# Patient Record
Sex: FEMALE | Race: OTHER | URBAN - METROPOLITAN AREA
[De-identification: names, ages, dates, MRNs, and addresses within clinical notes are randomized per-mention and may not be internally consistent; named-entity substitution may affect disease eponyms.]

---

## 2020-02-03 ENCOUNTER — OFFICE VISIT (OUTPATIENT)
Dept: PRIMARY CARE CLINIC | Age: 30
End: 2020-02-03

## 2020-02-03 VITALS
HEART RATE: 77 BPM | WEIGHT: 139.2 LBS | DIASTOLIC BLOOD PRESSURE: 80 MMHG | SYSTOLIC BLOOD PRESSURE: 130 MMHG | OXYGEN SATURATION: 99 % | TEMPERATURE: 97.8 F

## 2020-02-03 LAB
A/G RATIO: 1.6 (ref 1.1–2.2)
ALBUMIN SERPL-MCNC: 4.9 G/DL (ref 3.4–5)
ALP BLD-CCNC: 65 U/L (ref 40–129)
ALT SERPL-CCNC: 17 U/L (ref 10–40)
ANION GAP SERPL CALCULATED.3IONS-SCNC: 13 MMOL/L (ref 3–16)
AST SERPL-CCNC: 19 U/L (ref 15–37)
BASOPHILS ABSOLUTE: 0.1 K/UL (ref 0–0.2)
BASOPHILS RELATIVE PERCENT: 1 %
BILIRUB SERPL-MCNC: 0.4 MG/DL (ref 0–1)
BUN BLDV-MCNC: 13 MG/DL (ref 7–20)
CALCIUM SERPL-MCNC: 9.5 MG/DL (ref 8.3–10.6)
CHLORIDE BLD-SCNC: 104 MMOL/L (ref 99–110)
CHOLESTEROL, FASTING: 187 MG/DL (ref 0–199)
CO2: 25 MMOL/L (ref 21–32)
CREAT SERPL-MCNC: <0.5 MG/DL (ref 0.6–1.1)
EOSINOPHILS ABSOLUTE: 0.1 K/UL (ref 0–0.6)
EOSINOPHILS RELATIVE PERCENT: 1.6 %
GFR AFRICAN AMERICAN: >60
GFR NON-AFRICAN AMERICAN: >60
GLOBULIN: 3 G/DL
GLUCOSE FASTING: 90 MG/DL (ref 70–99)
HCT VFR BLD CALC: 39.7 % (ref 36–48)
HDLC SERPL-MCNC: 45 MG/DL (ref 40–60)
HEMOGLOBIN: 13.2 G/DL (ref 12–16)
LDL CHOLESTEROL CALCULATED: 117 MG/DL
LYMPHOCYTES ABSOLUTE: 2.2 K/UL (ref 1–5.1)
LYMPHOCYTES RELATIVE PERCENT: 34 %
MCH RBC QN AUTO: 30.7 PG (ref 26–34)
MCHC RBC AUTO-ENTMCNC: 33.4 G/DL (ref 31–36)
MCV RBC AUTO: 92.2 FL (ref 80–100)
MONOCYTES ABSOLUTE: 0.4 K/UL (ref 0–1.3)
MONOCYTES RELATIVE PERCENT: 6.6 %
NEUTROPHILS ABSOLUTE: 3.7 K/UL (ref 1.7–7.7)
NEUTROPHILS RELATIVE PERCENT: 56.8 %
PDW BLD-RTO: 13 % (ref 12.4–15.4)
PLATELET # BLD: 217 K/UL (ref 135–450)
PMV BLD AUTO: 10.4 FL (ref 5–10.5)
POTASSIUM SERPL-SCNC: 4.2 MMOL/L (ref 3.5–5.1)
RBC # BLD: 4.3 M/UL (ref 4–5.2)
SODIUM BLD-SCNC: 142 MMOL/L (ref 136–145)
TOTAL PROTEIN: 7.9 G/DL (ref 6.4–8.2)
TRIGLYCERIDE, FASTING: 126 MG/DL (ref 0–150)
VLDLC SERPL CALC-MCNC: 25 MG/DL
WBC # BLD: 6.5 K/UL (ref 4–11)

## 2020-02-03 PROCEDURE — 99203 OFFICE O/P NEW LOW 30 MIN: CPT | Performed by: INTERNAL MEDICINE

## 2020-02-03 ASSESSMENT — PATIENT HEALTH QUESTIONNAIRE - PHQ9
2. FEELING DOWN, DEPRESSED OR HOPELESS: 0
SUM OF ALL RESPONSES TO PHQ9 QUESTIONS 1 & 2: 0
1. LITTLE INTEREST OR PLEASURE IN DOING THINGS: 0
SUM OF ALL RESPONSES TO PHQ QUESTIONS 1-9: 0
SUM OF ALL RESPONSES TO PHQ QUESTIONS 1-9: 0

## 2020-02-03 ASSESSMENT — ENCOUNTER SYMPTOMS
COUGH: 0
ABDOMINAL DISTENTION: 0
BACK PAIN: 0
CHEST TIGHTNESS: 0
ABDOMINAL PAIN: 0
DIARRHEA: 0
NAUSEA: 0
SHORTNESS OF BREATH: 0

## 2020-02-03 NOTE — PROGRESS NOTES
SUBJECTIVE:  2/3/2020   Eleni Otero  1990    No past medical history on file. No past surgical history on file. No family history on file. Social History     Socioeconomic History    Marital status: Not on file     Spouse name: Not on file    Number of children: Not on file    Years of education: Not on file    Highest education level: Not on file   Occupational History    Not on file   Social Needs    Financial resource strain: Not on file    Food insecurity:     Worry: Not on file     Inability: Not on file    Transportation needs:     Medical: Not on file     Non-medical: Not on file   Tobacco Use    Smoking status: Never Smoker    Smokeless tobacco: Never Used   Substance and Sexual Activity    Alcohol use: Not on file    Drug use: Not on file    Sexual activity: Not on file   Lifestyle    Physical activity:     Days per week: Not on file     Minutes per session: Not on file    Stress: Not on file   Relationships    Social connections:     Talks on phone: Not on file     Gets together: Not on file     Attends [employer] service: Not on file     Active member of club or organization: Not on file     Attends meetings of clubs or organizations: Not on file     Relationship status: Not on file    Intimate partner violence:     Fear of current or ex partner: Not on file     Emotionally abused: Not on file     Physically abused: Not on file     Forced sexual activity: Not on file   Other Topics Concern    Not on file   Social History Narrative    Not on file      No Known Allergies   No current outpatient medications on file. No current facility-administered medications for this visit. Chief Complaint   Patient presents with   Alexis Peralta New Doctor     Speaks Citizen of [place] but understands a lot of English    HPI: New patient visit, she states she is here to establish with a new PCP. She takes no chronic medications, has no ongoing complaints.   She is never had any fasting blood

## 2020-07-14 LAB — PAP SMEAR, EXTERNAL: NEGATIVE

## 2022-01-04 ENCOUNTER — OFFICE VISIT (OUTPATIENT)
Dept: PRIMARY CARE CLINIC | Age: 32
End: 2022-01-04

## 2022-01-04 VITALS
HEART RATE: 83 BPM | DIASTOLIC BLOOD PRESSURE: 76 MMHG | WEIGHT: 127.4 LBS | HEIGHT: 61 IN | BODY MASS INDEX: 24.05 KG/M2 | SYSTOLIC BLOOD PRESSURE: 106 MMHG

## 2022-01-04 DIAGNOSIS — F41.8 DEPRESSION WITH ANXIETY: ICD-10-CM

## 2022-01-04 DIAGNOSIS — G47.00 INSOMNIA, UNSPECIFIED TYPE: ICD-10-CM

## 2022-01-04 DIAGNOSIS — Z13.29 THYROID DISORDER SCREEN: ICD-10-CM

## 2022-01-04 DIAGNOSIS — Z12.4 PAP SMEAR FOR CERVICAL CANCER SCREENING: ICD-10-CM

## 2022-01-04 DIAGNOSIS — Z23 NEEDS FLU SHOT: ICD-10-CM

## 2022-01-04 DIAGNOSIS — F41.8 DEPRESSION WITH ANXIETY: Primary | ICD-10-CM

## 2022-01-04 LAB
A/G RATIO: 1.3 (ref 1.1–2.2)
ALBUMIN SERPL-MCNC: 4.6 G/DL (ref 3.4–5)
ALP BLD-CCNC: 64 U/L (ref 40–129)
ALT SERPL-CCNC: 6 U/L (ref 10–40)
ANION GAP SERPL CALCULATED.3IONS-SCNC: 12 MMOL/L (ref 3–16)
AST SERPL-CCNC: 21 U/L (ref 15–37)
BASOPHILS ABSOLUTE: 0 K/UL (ref 0–0.2)
BASOPHILS RELATIVE PERCENT: 0.3 %
BILIRUB SERPL-MCNC: 0.6 MG/DL (ref 0–1)
BUN BLDV-MCNC: 11 MG/DL (ref 7–20)
CALCIUM SERPL-MCNC: 9.5 MG/DL (ref 8.3–10.6)
CHLORIDE BLD-SCNC: 99 MMOL/L (ref 99–110)
CO2: 25 MMOL/L (ref 21–32)
CREAT SERPL-MCNC: <0.5 MG/DL (ref 0.6–1.1)
EOSINOPHILS ABSOLUTE: 0.1 K/UL (ref 0–0.6)
EOSINOPHILS RELATIVE PERCENT: 1.3 %
GFR AFRICAN AMERICAN: >60
GFR NON-AFRICAN AMERICAN: >60
GLUCOSE BLD-MCNC: 93 MG/DL (ref 70–99)
HCT VFR BLD CALC: 39.3 % (ref 36–48)
HEMOGLOBIN: 13.7 G/DL (ref 12–16)
LYMPHOCYTES ABSOLUTE: 2.4 K/UL (ref 1–5.1)
LYMPHOCYTES RELATIVE PERCENT: 29.9 %
MCH RBC QN AUTO: 30.6 PG (ref 26–34)
MCHC RBC AUTO-ENTMCNC: 34.8 G/DL (ref 31–36)
MCV RBC AUTO: 87.9 FL (ref 80–100)
MONOCYTES ABSOLUTE: 0.6 K/UL (ref 0–1.3)
MONOCYTES RELATIVE PERCENT: 7.4 %
NEUTROPHILS ABSOLUTE: 4.9 K/UL (ref 1.7–7.7)
NEUTROPHILS RELATIVE PERCENT: 61.1 %
PDW BLD-RTO: 12.8 % (ref 12.4–15.4)
PLATELET # BLD: 258 K/UL (ref 135–450)
PMV BLD AUTO: 9.1 FL (ref 5–10.5)
POTASSIUM SERPL-SCNC: 4.2 MMOL/L (ref 3.5–5.1)
RBC # BLD: 4.47 M/UL (ref 4–5.2)
SODIUM BLD-SCNC: 136 MMOL/L (ref 136–145)
TOTAL PROTEIN: 8.2 G/DL (ref 6.4–8.2)
TSH SERPL DL<=0.05 MIU/L-ACNC: 0.96 UIU/ML (ref 0.27–4.2)
WBC # BLD: 8 K/UL (ref 4–11)

## 2022-01-04 PROCEDURE — 90471 IMMUNIZATION ADMIN: CPT | Performed by: INTERNAL MEDICINE

## 2022-01-04 PROCEDURE — 90674 CCIIV4 VAC NO PRSV 0.5 ML IM: CPT | Performed by: INTERNAL MEDICINE

## 2022-01-04 PROCEDURE — 99214 OFFICE O/P EST MOD 30 MIN: CPT | Performed by: INTERNAL MEDICINE

## 2022-01-04 RX ORDER — LANOLIN ALCOHOL/MO/W.PET/CERES
3 CREAM (GRAM) TOPICAL NIGHTLY
COMMUNITY
End: 2022-04-04

## 2022-01-04 RX ORDER — HYDROXYZINE PAMOATE 25 MG/1
CAPSULE ORAL
COMMUNITY
Start: 2021-12-28 | End: 2022-04-04

## 2022-01-04 RX ORDER — HYDROXYZINE PAMOATE 25 MG/1
25 CAPSULE ORAL 3 TIMES DAILY PRN
COMMUNITY
Start: 2021-12-28 | End: 2022-04-04

## 2022-01-04 RX ORDER — DOXEPIN HYDROCHLORIDE 25 MG/1
25 CAPSULE ORAL NIGHTLY
Qty: 30 CAPSULE | Refills: 5 | Status: SHIPPED | OUTPATIENT
Start: 2022-01-09 | End: 2022-04-04

## 2022-01-04 RX ORDER — DOXEPIN HYDROCHLORIDE 10 MG/1
10 CAPSULE ORAL NIGHTLY
Qty: 5 CAPSULE | Refills: 0 | Status: SHIPPED | OUTPATIENT
Start: 2022-01-04 | End: 2022-04-04

## 2022-01-04 RX ORDER — TRAZODONE HYDROCHLORIDE 50 MG/1
TABLET ORAL
COMMUNITY
Start: 2021-12-23 | End: 2022-01-04

## 2022-01-04 ASSESSMENT — ENCOUNTER SYMPTOMS
NAUSEA: 0
COUGH: 0
BLOOD IN STOOL: 0
CONSTIPATION: 0
WHEEZING: 0
ABDOMINAL PAIN: 0
DIARRHEA: 0
SHORTNESS OF BREATH: 0
EYE REDNESS: 0
COLOR CHANGE: 0
ABDOMINAL DISTENTION: 0
SINUS PRESSURE: 0
TROUBLE SWALLOWING: 0
SORE THROAT: 0
EYE PAIN: 0
CHEST TIGHTNESS: 0
VOMITING: 0
BACK PAIN: 0

## 2022-01-04 ASSESSMENT — PATIENT HEALTH QUESTIONNAIRE - PHQ9
SUM OF ALL RESPONSES TO PHQ QUESTIONS 1-9: 7
6. FEELING BAD ABOUT YOURSELF - OR THAT YOU ARE A FAILURE OR HAVE LET YOURSELF OR YOUR FAMILY DOWN: 1
7. TROUBLE CONCENTRATING ON THINGS, SUCH AS READING THE NEWSPAPER OR WATCHING TELEVISION: 1
SUM OF ALL RESPONSES TO PHQ QUESTIONS 1-9: 7
3. TROUBLE FALLING OR STAYING ASLEEP: 2
1. LITTLE INTEREST OR PLEASURE IN DOING THINGS: 1
SUM OF ALL RESPONSES TO PHQ9 QUESTIONS 1 & 2: 2
SUM OF ALL RESPONSES TO PHQ QUESTIONS 1-9: 2
SUM OF ALL RESPONSES TO PHQ QUESTIONS 1-9: 7
SUM OF ALL RESPONSES TO PHQ QUESTIONS 1-9: 2
2. FEELING DOWN, DEPRESSED OR HOPELESS: 1
5. POOR APPETITE OR OVEREATING: 0
SUM OF ALL RESPONSES TO PHQ QUESTIONS 1-9: 2
4. FEELING TIRED OR HAVING LITTLE ENERGY: 2
8. MOVING OR SPEAKING SO SLOWLY THAT OTHER PEOPLE COULD HAVE NOTICED. OR THE OPPOSITE, BEING SO FIGETY OR RESTLESS THAT YOU HAVE BEEN MOVING AROUND A LOT MORE THAN USUAL: 1
10. IF YOU CHECKED OFF ANY PROBLEMS, HOW DIFFICULT HAVE THESE PROBLEMS MADE IT FOR YOU TO DO YOUR WORK, TAKE CARE OF THINGS AT HOME, OR GET ALONG WITH OTHER PEOPLE: 1
SUM OF ALL RESPONSES TO PHQ QUESTIONS 1-9: 7
SUM OF ALL RESPONSES TO PHQ QUESTIONS 1-9: 2
9. THOUGHTS THAT YOU WOULD BE BETTER OFF DEAD, OR OF HURTING YOURSELF: 0

## 2022-01-04 NOTE — ASSESSMENT & PLAN NOTE
Patient  with depression anxiety and insomnia. Trazodone did not help    Patient has tried melatonin. We discussed starting doxepin which  Has indications for depression, anxiety, and insomnia. We will try to titrate dose granted patient is able to tolerate medicine.   Patient informed she is to call the office if any side effects from the medicine

## 2022-01-04 NOTE — ASSESSMENT & PLAN NOTE
Patient patient with depression anxiety and insomnia. Trazodone did not help   We discussed starting doxepin which  Has indications for depression, anxiety, and insomnia. We will try to titrate dose granted patient is able to tolerate medicine.   Patient informed she is to call the office if any side effects from the medicine  Patient given paperwork for clinic with access to a counsellor

## 2022-01-04 NOTE — PATIENT INSTRUCTIONS
Patient Education        Trastorno de ansiedad: Instrucciones de cuidado  Anxiety Disorder: Care Instructions  Instrucciones de cuidado    La ansiedad es clyde reacción normal contra el estrés. Las situaciones difíciles le pueden causar síntomas sj sudoración en las wendy de las don y clyde sensación de nerviosismo. En el trastorno de [place], los síntomas son W prosper graves. La preocupación cam, la tensión muscular, la dificultad para dormir, las náuseas y la diarrea, y otros síntomas pueden hacer que las actividades diarias se sravani difíciles o imposibles. Estos síntomas pueden ocurrir sin ninguna causa y pueden afectar miramontes Richard Rogue y miramontes emmanuel social. Los medicamentos, la asesoría psicológica y el cuidado personal pueden ayudar. La atención de seguimiento es clyde parte clave de miramontes tratamiento y seguridad. Asegúrese de hacer y acudir a todas las citas, y llame a miramontes médico si está teniendo problemas. También es clyde buena idea saber los resultados de david exámenes y mantener clyde lista de los medicamentos que antonia. ¿Cómo puede cuidarse en el hogar? · [place] david medicamentos exactamente [employer]	[place] indicaron. Llame a miramontes médico si renee estar teniendo problemas con miramontes medicamento. · Asista a todas las citas de asesoría psicológica y [id number]. · Reconozca y acepte miramontes ansiedad. Entonces, cuando esté en clyde situación que le katharina sentir ansioso, dígase a usted mismo, \"[place] no es clyde emergencia. Me siento incómodo, ry no estoy en peligro. Puedo continuar aunque estoy ansioso\". · Cuide miramontes cuerpo:  ? Alivie la tensión con ejercicios o masajes. ? Descanse lo suficiente. ? Evite el alcohol, la cafeína, la nicotina y las drogas [address] Estas sustancias pueden aumentar los niveles de ansiedad y ocasionarle problemas para dormir. ? Soledad Willow y practique técnicas de relajación. A continuación encontrará más datos sobre estas técnicas. · Mantenga ocupada miramontes mente.  Kelsey y katharina [employer] gustan. Vaya a lucero clyde película cómica, o salga de paseo o a caminar. Planifique miramontes día. Tener demasiadas actividades o muy pocas le puede crear ansiedad. · Lleve un registro de david síntomas. Hable sobre david temores con un buen amigo o un miembro de la [place], o únase a un hosea de apoyo para personas con problemas similares. Hablar con otras personas a veces ariel el estrés. · Participe en grupos sociales u ofrézcase sj voluntario para ayudar a otras personas. Estar solo a veces hace que las cosas parezcan peores de lo que son. · Fabien ejercicio por lo menos 30 minutos la [employer] días de la semana para aliviar el estrés. Caminar es clyde buena opción. Es posible que también quiera hacer otras actividades, sj correr, nadar, [employer], o jugar al tenis u otros deportes de equipo. Técnicas de relajación  Fabien ejercicios de relajación ricky 10 a 20 minutos al día. Si lo desea, mientras los hace puede escuchar música tranquila y relajante. · Dígales a las demás personas de miramontes hogar que va a hacer ejercicios de relajación. Pídales que no lo interrumpan. · Encuentre un lugar cómodo, lejos de toda distracción y ruido. · Acuéstese boca arriba o siéntese con la espalda derecha. · Concéntrese en miramontes respiración. Respire de [place] lenta y cam. · Inhale por la [place]. Exhale por la nariz o la boca. · Respire profundamente, llenando la kavin entre el ombligo y la caja torácica. Respire de forma iam que el estómago se mueva hacia arriba y [place]. · No contenga la respiración. · Respire de esta manera ricky 5 a 10 minutos. [place] la sensación de serenidad en todo el cuerpo. Mientras continúa con la respiración lenta y profunda, relájese con los siguientes ejercicios ricky otros 5 a 10 minutos:  · Tensione y relaje cada hosea de músculos de miramontes cuerpo. Puede comenzar con la punta de los pies y continuar hacia arriba hasta llegar a la philip.   · Imagine a los grupos de [employer] bajo licencia por [facility] Si usted tiene [place] afección médica o sobre estas instrucciones, siempre pregunte a miramontes profesional de bonnie. [facility] niega toda garantía o responsabilidad por miramontes uso de esta información. Patient Education        Insomnio: Instrucciones de cuidado  Insomnia: Care Instructions  Generalidades     Insomnio es la incapacidad de dormir ascencion. El insomnio puede dificultar que usted concilie el sueño, permanezca dormido o duerma tanto sj necesita. [place] puede hacer que esté cansado y malhumorado ricky el día. También puede hacer que sea [place], [facility] y [id number] en [place]. El insomnio puede estar relacionado con muchas cosas. Estas incluyen problemas de bonnie, medicamentos y acontecimientos estresantes. El tratamiento puede incluir tratar los problemas que podrían estar relacionados con el insomnio. [address] incluye cambios conductuales y de   [place]. [place] puede incluir terapia cognitivo-conductual para el insomnio (CBT-I, por david siglas en inglés). La CBT-I [employer] de ayudarle a cambiar los pensamientos y comportamientos que pueden interferir en el sueño. El médico puede recomendarle cosas específicas que puede probar. Los ejemplos incluyen hacer ejercicios de relajación, mantener horarios regulares para acostarse y despertarse, limitar el consumo de alcohol y adoptar hábitos de sueño saludables. Algunas personas deciden ru medicamentos ricky un tiempo sj ayuda para dormir. La atención de seguimiento es clyde parte clave de miramontes tratamiento y seguridad. Asegúrese de hacer y acudir a todas las citas, y llame a miramontes médico si está teniendo problemas. También es clyde buena idea saber los resultados de david exámenes y mantener clyde lista de los medicamentos que antonia. ¿Cómo puede cuidarse en el hogar?   Terapia cognitivo-conductual para el insomnio (CBT-I, por david siglas en inglés)  · Si miramontes médico recomienda la CBT-I, siga miramontes plan de tratamiento. El médico le dará instrucciones únicas para usted. · Es probable que miramontes plan incluya algunas cosas que puede probar en casa. Por ejemplo:  ? Pruebe la meditación u otras técnicas de relajación antes de [place]. ? Acuéstese a la misma hora todas las noches y despiértese a la misma hora todas las [place]. No duerma siestas ricky el día. ? No permanezca despierto en la cama ricky mucho tiempo. Si no puede conciliar el sueño o si se despierta en mitad de la noche y no puede volver a dormirse al cabo de unos 15 a 20 minutos, levántese de la cama y Kirsten Mansi a otra habitación hasta que sienta sueño. ? Si mirar el reloj le causa ansiedad, gírelo para que no pueda lucero la hora. ? Si se preocupa cuando se acuesta, empiece un \"libro de preocupaciones\". Bastante antes de que llegue la hora de [place], anote david preocupaciones y luego deje el libro y david inquietudes a un lado. Hábitos de sueño saludables  · Si miramontes médico lo recomienda, trate de establecer hábitos de sueño saludables. Por ejemplo:  ? Mantenga el dormitorio tranquilo, oscuro y fresco.  ? No ingiera bebidas con cafeína, sj café o té alyssa, ricky 8 horas antes de acostarse.  ? No fume ni use otros tipos de tabaco cerca de la hora de acostarse. La nicotina es un estimulante y puede mantenerlo despierto. ? Evite beber alcohol a última hora de la tarde, ya que puede hacer que se despierte en mitad de la noche.  ? No ingiera clyde comida abundante cerca de la hora de [place]. Si tiene hambre, coma un refrigerio ligero. ? No terrell mucha agua cerca de la hora de acostarse, porque la necesidad de orinar puede despertarlo por la noche.  ? No mirian, bernardo televisión ni use miramontes teléfono en la cama. Use la cama solo para dormir y [place]. Medicamentos  · Sea mateo con los medicamentos. Mier david medicamentos exactamente sj se los recetaron.  Llame a miramontes médico si renee que está teniendo un problema con miramontes medicamento. · Hable con miramontes médico antes de probar un medicamento de venta madelin, producto herbario o suplemento para tratar de dormir mejor. Miramontes médico puede recomendarle la cantidad que debe ru y cuándo tomarlo. Asegúrese de que miramontes médico sepa acerca de todos los medicamentos, vitaminas, productos herbarios y suplementos que antonia. · Recibirá [employer] medicamentos específicos recetados por el médico.  ¿Cuándo debe pedir ayuda? Preste especial atención a los cambios en miramontes bonnie y asegúrese de comunicarse con miramontes médico si:    · Mercedes esfuerzos por mejorar el sueño no eugene resultado.     · Miramontes insomnio empeora.     · Se ha estado sintiendo decaído, deprimido o desesperanzado, o ha perdido el interés por cosas que disfrutaba hacer. ¿Dónde puede encontrar más información en inglés? Amadeo Monge a [email or web address] e ingrese a miramontes cuenta de MyChart. [place] P513 en el [place]ola Juan \"Search Health Information\" para más información (en inglés) sobre \"Insomnio: Instrucciones de cuidado. \"     Si no tiene clyde cuenta, katharina lino en el enlace \"Sign Up Now\". Revisado: 16 junio, 2021               Versión del contenido: 13.1  © 6913-3190 Healthwise, Incorporated. Las instrucciones de cuidado fueron adaptadas bajo licencia por [facility] ([facility]). Si usted tiene [place] afección médica o sobre estas instrucciones, siempre pregunte a miramontes profesional de bonnie. Healthwise, Incorporated niega toda garantía o responsabilidad por miramontes uso de esta información. Patient Education        Recuperación de la depresión: Instrucciones de cuidado  Recovering From Depression: Care Instructions  Instrucciones de cuidado     Tener un buen cuidado de usted mismo es importante a medida que se recupera de la depresión. Con el tiempo, a medida que el tratamiento funcione mercedes síntomas desaparecerán. No lo abandone. Al contrario, concentre miramontes energía en recuperarse. Miramontes estado de ánimo mejorará.  Solo tomará un tiempo. Concéntrese en cosas que le pueden ayudar a sentirse mejor, sj estar con amigos o familiares, comer ascencion y descansar lo suficiente. [place]bel tómese las cosas con tranquilidad. No katharina muchas actividades demasiado pronto. Abeba Bodo a sentirse mejor poco a [place] de seguimiento es clyde parte clave de miramontes tratamiento y seguridad. Asegúrese de hacer y acudir a todas las citas, y llame a miramontes médico si está teniendo problemas. También es clyde buena idea saber los resultados de david exámenes y mantener clyde lista de los medicamentos que antonia. ¿Cómo puede cuidarse en el hogar? Sea realista  · Si debe hacer clyde tarea que le llevará [place] en varias etapas pequeñas que usted pueda manejar y katharina solo lo que pueda. · Es posible que [place] posponer las decisiones importantes hasta que se haya recuperado de la depresión. Si tiene planes que tendrán un gran impacto en miramontes emmanuel, sj casarse, divorciarse o cambiar de [place], intente esperar un poco. Háblelo con david amigos y seres queridos, quienes pueden ayudarle a analizar el panorama completo. · Es importante acercarse a las personas para pedirles ayuda. No se aísle. Deje que miramontes nina y [employer]. Encuentre a alguien en quien pueda confiar y hable con lore persona. · Sea paciente y bondadoso consigo mismo. Recuerde que la depresión no es miramontes culpa y no es algo que usted pueda superar solo con fuerza de voluntad. El tratamiento es importante para la depresión, al igual que para cualquier otra enfermedad. Sentirse mejor lleva tiempo, y miramontes estado de ánimo mejorará poco a poco.  Manténgase activo  · Manténgase ocupado y [address]. Salga a caminar o intente con algún otro ejercicio suave. · Consulte a miramontes médico acerca de algún programa de ejercicios. El ejercicio le puede ayudar a aliviar la depresión leve. · Vaya al cine o a un concierto.  Participe en alguna actividad de la joseline o [place] otra reunión social. Kendrick Don a lucero a un partido de fútbol. · Pídale a un amigo que cene con usted. Cuídese  · Siga clyde dieta equilibrada con muchas frutas y verduras frescas, granos integrales y tsai magras de proteínas. Si perdió el apetito, coma pequeños refrigerios en lugar de comidas abundantes. · Evite el consumo de drogas ilegales o marihuana y no terrell alcohol. No tome medicamentos que no le hayan sido recetados a usted. Estos podrían interferir con los medicamentos que pudiera estar tomando para la depresión, o podrían empeorar la depresión. · [employer] medicamentos iam sj le fueron recetados. Usted podría empezar a sentirse mejor entre 1 y 3 semanas de estar tomando los antidepresivos. [place] puede necesitar hasta 6 u 8 semanas para lucero más mejoría. Hable con miramontes médico si tiene preguntas o inquietudes acerca de david medicamentos, o si no observa ninguna mejoría en 3 semanas. · Siga tomando miramontes medicamento después de que david síntomas mejoren. Angelito miramontes medicamento ricky al menos 6 meses después de sentirse mejor puede ayudarle a evitar deprimirse de nuevo. Si no es la primera vez que ha estado deprimido, el médico podría recomendarle que tome miramontes medicamento ricky incluso más [address]. · Informe a miramontes médico si tiene efectos secundarios causados por miramontes medicamento. Muchos efectos secundarios son leves y desaparecerán por sí solos después de que haya tomado el medicamento ricky algunas semanas. Algunos pueden durar [employer]. Hable con miramontes médico si los efectos secundarios son demasiado molestos. Iam vez pueda probar un medicamento diferente. · Continúe con el asesoramiento. Puede ayudar a evitar que vuelva a tener depresión, especialmente si ha tenido múltiples episodios de depresión. Hable con miramontes consejero si tiene dificultad para asistir a david sesiones o si renee que las sesiones no le están funcionando. No deje de acudir. · Duerma lo suficiente. Hable con miramontes médico si tiene problemas para dormir.   · Evite las pastillas para dormir a menos que hayan sido recetadas por el médico que está tratando miramontes depresión. Las pastillas para dormir podrían hacerlo sentir aturdido ricky el día e interactuar con otros medicamentos que tome. · Si tiene alguna otra enfermedad, sj diabetes, enfermedad del corazón o presión arterial hazel, asegúrese de continuar con miramontes tratamiento. Hable con miramontes médico acerca de todos los medicamentos que antonia, incluyendo aquellos con o sin receta. · Si usted o alguien a quien conoce habla acerca de suicidarse, autolesionarse o sentirse desesperado, busque ayuda de inmediato. Llame a la [place] nacional para la prevención del suicidio al [phone] ([phone]) o envíe un mensaje de texto [facility] al [id number] para acceder a la [place] de mensajes de texto en casos de crisis. Considere guardar estos números en miramontes teléfono. ¿Cuándo debe pedir ayuda? Llame al 911 en cualquier momento que considere que necesita atención de [place]. Por ejemplo, llame si:    · Siente ganas de lastimarse a sí mismo o a otra persona.     · Alguien que conoce está deprimido y está intentando suicidarse o está a punto de hacerlo. Llame a miramontes médico ahora mismo o busque atención médica inmediata si:    · Oye voces.     · Alguien que usted conoce está deprimido y:  ? Kathyleen Koroma a regalar david posesiones. ? Usa drogas ilegales o eric alcohol en exceso. ? Habla o escribe acerca de la muerte, lo que incluye notas de suicidio o [address] priscilla de raphael, cuchillos o pastillas. ? Kathyleen Koroma a pasar mucho tiempo a solas. ? Actúa de manera muy agresiva o parece calmado de repente. Preste especial atención a los cambios en miramontes obnnie y asegúrese de comunicarse con miramontes médico si:    · No mejora sj se esperaba. ¿Dónde puede encontrar más información en inglés? Art Goran a [email or web address] e ingrese a miramontes cuenta de MyChart.  Cynthia Gresham V058 en el [facility] \"Search Health Information\" para más información (en inglés) sobre \"Recuperación de la depresión: Instrucciones de cuidado. \"     Si no tiene clyde cuenta, katharina clic en el enlace \"Sign Up Now\". Revisado: 16 junio, 2021               Versión del contenido: 13.1  © 8894-8097 Healthwise, Incorporated. Las instrucciones de cuidado fueron adaptadas bajo licencia por [address] Si usted tiene [place] afección médica o sobre estas instrucciones, siempre pregunte a miramontes profesional de bonnie. Healthwise, Incorporated niega toda garantía o responsabilidad por miramontes uso de esta información.

## 2022-01-04 NOTE — PROGRESS NOTES
[address] [place] [address] Dr (1990) is a 32 y.o. female   Insomnia (several weeks)     General health: This patient presents for check up and refills. The problem and medicine lists and chart were reviewed in detail. The patient has been worked up and treated for this/these condition/s and is compliant with taking the medication without any significant side effects. The patient's condition/s is/are chronic and unchanged and otherwise remains stable. Feels well with minor complaints. Main Problem Review -patient/anxiety/insomnia - Patient  with depression anxiety and insomnia. Patient was on Trazodone but it did not help. Patient has tried melatonin. Patient says she has trouble falling asleep, her mind races. Patient says the sleep deprivation is causing significant amount of anxiety and depression. Patient denies suicidal ideation or plan    Health Maintenance    Urinary Incontinence screen - NEG screen  Flu shot - 1/4/2022   COVID-19 shot -patient due for COVID-19 booster shot   Annual retinal eye exam - Patient due  will need to schedule  Annual Dentist visit - Patient due  will need to schedule  Tobacco smoking  -  NO  Alcohol Misuse - NO  Illicit Drug Use- NO  Healthy Diet and physical activity -YES  Obesity Screen- screened 1/4/2022   Wears seat belt- YES  ASA prophylaxis - Aspirin Use to Prevent Cardiovascular Disease and Colorectal Cancer: Preventive Medication: adults aged 48 to 61 years with a 10% or greater 10-year cvd risk  End of life directives discussed with patient. -Mentions she does not have  a will/or/an advanced directives. Was instructed to start process looking into preparing her will an advanced directives. The ASCVD Risk score (Media Pile., et al., 2013) failed to calculate for the following reasons:     The 2013 ASCVD risk score is only valid for ages 36 to 78     Review of Systems   Constitutional: Negative for activity change, appetite change, chills, fatigue, fever and unexpected Neck:      Vascular: No carotid bruit. Cardiovascular:      Rate and Rhythm: Normal rate and regular rhythm. Pulses: Normal pulses. Heart sounds: Normal heart sounds. No murmur heard. No gallop. Pulmonary:      Effort: Pulmonary effort is normal.      Breath sounds: Normal breath sounds. No wheezing, rhonchi or rales. Abdominal:      General: Abdomen is flat. Bowel sounds are normal. There is no distension. Palpations: Abdomen is soft. Tenderness: There is no abdominal tenderness. There is no guarding or rebound. Musculoskeletal:         General: No swelling or tenderness. Normal range of motion. Cervical back: No tenderness. Lymphadenopathy:      Cervical: No cervical adenopathy. Skin:     Findings: No erythema, lesion or rash. Neurological:      General: No focal deficit present. Mental Status: She is alert and oriented to person, place, and time. Mental status is at baseline. Cranial Nerves: No cranial nerve deficit. Motor: No weakness. Psychiatric:         Mood and Affect: Mood normal.         Behavior: Behavior normal.         Thought Content: Thought content normal.         Judgment: Judgment normal.         ASSESSMENT/PLAN:  1. Depression with anxiety  Assessment & Plan:  Patient patient with depression anxiety and insomnia. Trazodone did not help   We discussed starting doxepin which  Has indications for depression, anxiety, and insomnia. We will try to titrate dose granted patient is able to tolerate medicine. Patient informed she is to call the office if any side effects from the medicine  Patient given paperwork for clinic with access to a counsellor  Orders:  -     doxepin (SINEQUAN) 10 MG capsule; Take 1 capsule by mouth nightly for 5 days After 5 days patient will increase to 25 mg dose., Disp-5 capsule, R-0Normal  -     doxepin (SINEQUAN) 25 MG capsule;  Take 1 capsule by mouth nightly [place] 1/9/2022, Disp-30 capsule, R-5Normal  -     TSH without Reflex; Future  -     CBC Auto Differential; Future  -     Comprehensive Metabolic Panel; Future  2. Insomnia, unspecified type  Assessment & Plan:  Patient  with depression anxiety and insomnia. Trazodone did not help    Patient has tried melatonin. We discussed starting doxepin which  Has indications for depression, anxiety, and insomnia. We will try to titrate dose granted patient is able to tolerate medicine. Patient informed she is to call the office if any side effects from the medicine      Orders:  -     doxepin (SINEQUAN) 10 MG capsule; Take 1 capsule by mouth nightly for 5 days After 5 days patient will increase to 25 mg dose., Disp-5 capsule, R-0Normal  -     doxepin (SINEQUAN) 25 MG capsule; Take 1 capsule by mouth nightly [place] 1/9/2022, Disp-30 capsule, R-5Normal  3. Pap smear for cervical cancer screening  -     [facility] - An Amaya MD, Gynecology, [facility]  4. Needs flu shot  Assessment & Plan:  Given  1/4/2022   Orders:  -     INFLUENZA, MDCK QUADV, 2 YRS AND OLDER, IM, PF, PREFILL SYR OR SDV, 0.5ML (FLUCELVAX QUADV, PF)  5. Thyroid disorder screen  -     TSH without Reflex; Future      Given referral and will make appointment to specialist.    Preventative care discussed. Encouraged healthy diet choices, reg exercise. Patient agreed w/plan and verbal understanding. Patient advised to call or return with any concerns or problems or worsening conditions. Go to the ER for any severe or potentially life threatening problems. Return in about 3 months (around 4/4/2022). This note was generated in part or in whole with voice recognition software. Voice recognition is usually quite accurate but there are transcription errors that can often occur. All attempts were made to correct these errors I apologized for any  typographical errors that were not detected and corrected. Electronically signed by Bette Shanks MD on 1/4/2022 at 4:23 PM.

## 2022-01-25 ENCOUNTER — TELEPHONE (OUTPATIENT)
Dept: PRIMARY CARE CLINIC | Age: 32
End: 2022-01-25

## 2022-01-25 DIAGNOSIS — R51.9 CHRONIC NONINTRACTABLE HEADACHE, UNSPECIFIED HEADACHE TYPE: Primary | ICD-10-CM

## 2022-01-25 DIAGNOSIS — G89.29 CHRONIC NONINTRACTABLE HEADACHE, UNSPECIFIED HEADACHE TYPE: Primary | ICD-10-CM

## 2022-01-25 NOTE — TELEPHONE ENCOUNTER
Gambian speaking patient    Spoke with patient she was recently seen at [facility] emergency room for headache, patient had CTA head which was within normal limits. Patient was told she should see a Neurologist.    Patient referred to neurology. Patient still having issues with insomnia resulted in anxiety. Patient stopped doxepin because as per patient  She was asleep from her  neck down, however her mind was still racing. She has upcoming office visit in February we will consider alternatives to help with anxiety and insomnia. Patient was told I would like to avoid any medicines that could cause dependency such as benzodiazepines and certain sleep aids.

## 2022-01-25 NOTE — TELEPHONE ENCOUNTER
Patient's wife saw Dr. Lexi Davis back at the beginning of January. The medication that Dr. Lexi Davis prescribed for the patient's insomnia is not working.  The patient went to the hospital for headaches and the doctor at the hospital recommended she reach out to her PCP for a referral to a neurologist.

## 2022-01-27 ENCOUNTER — TELEPHONE (OUTPATIENT)
Dept: PRIMARY CARE CLINIC | Age: 32
End: 2022-01-27

## 2022-01-27 DIAGNOSIS — F41.8 DEPRESSION WITH ANXIETY: Primary | ICD-10-CM

## 2022-01-27 DIAGNOSIS — G47.00 INSOMNIA, UNSPECIFIED TYPE: ICD-10-CM

## 2022-01-27 RX ORDER — AMITRIPTYLINE HYDROCHLORIDE 25 MG/1
25 TABLET, FILM COATED ORAL NIGHTLY
Qty: 30 TABLET | Refills: 5 | Status: SHIPPED | OUTPATIENT
Start: 2022-01-27 | End: 2022-02-02 | Stop reason: SDUPTHER

## 2022-01-27 NOTE — TELEPHONE ENCOUNTER
Eritrean speaking patient    Spoke with patient and  regarding insomnia, anxiety, headaches. Patient was recently seen at [facility] for severe headache likely due to sleep deprivation. Patient has been off of doxepin for at least 3 weeks. We discussed a trial of amitriptyline 25 mg, has indications for anxiety/depression, insomnia, migraine and tension headaches. Her insomnia - sleep deprivation has resulted in anxiety/depression and has also triggered headaches. Patient informed of potential side effects, also told to look out for any signs of allergy. Patient  confirmed verbally understanding of this recommendation and requested that I send a prescription for amitriptyline to her pharmacy.

## 2022-02-02 ENCOUNTER — TELEPHONE (OUTPATIENT)
Dept: PRIMARY CARE CLINIC | Age: 32
End: 2022-02-02

## 2022-02-02 DIAGNOSIS — F41.8 DEPRESSION WITH ANXIETY: ICD-10-CM

## 2022-02-02 DIAGNOSIS — G47.00 INSOMNIA, UNSPECIFIED TYPE: ICD-10-CM

## 2022-02-02 RX ORDER — AMITRIPTYLINE HYDROCHLORIDE 50 MG/1
50 TABLET, FILM COATED ORAL NIGHTLY
Qty: 30 TABLET | Refills: 5 | Status: SHIPPED | OUTPATIENT
Start: 2022-02-14 | End: 2022-04-04 | Stop reason: SDUPTHER

## 2022-02-02 NOTE — TELEPHONE ENCOUNTER
Patient's  called to say the  amitriptyline (ELAVIL) 25 MG tablet worked well for the first 5 days but his wife was awake all last night. He was wondering if she could take more than 1 capsule at night.

## 2022-02-02 NOTE — TELEPHONE ENCOUNTER
Yes he could take 2 tablets = 50 mg.  They will have to fill their new prescription in 2 weeks, it will be at their pharmacy

## 2022-02-04 NOTE — TELEPHONE ENCOUNTER
Called patient's  to let him know about his wife taking 2 tablets.  said they had already received the message and his wife was sleeping.

## 2022-03-08 ENCOUNTER — TELEPHONE (OUTPATIENT)
Dept: PRIMARY CARE CLINIC | Age: 32
End: 2022-03-08

## 2022-03-08 NOTE — TELEPHONE ENCOUNTER
----- Message from Esvin oH MD sent at 2/21/2022 10:37 AM EST -----  Regarding: FW: insomnia anxiety  Please call patient  to ask about her insomnia/depression/anxiety and find out how she is doing. /AMARILYS   ----- Message -----  From: Esvin Ho MD  Sent: 2/7/2022   8:00 AM EST  To: Esvin Ho MD  Subject: insomnia anxiety                                 Please call patient  to ask about his/her insomnia/depression/anxiety and find out how he/she is doing./AMARILYS

## 2022-04-04 ENCOUNTER — OFFICE VISIT (OUTPATIENT)
Dept: PRIMARY CARE CLINIC | Age: 32
End: 2022-04-04

## 2022-04-04 VITALS
BODY MASS INDEX: 24.62 KG/M2 | HEART RATE: 84 BPM | DIASTOLIC BLOOD PRESSURE: 74 MMHG | OXYGEN SATURATION: 99 % | RESPIRATION RATE: 16 BRPM | HEIGHT: 61 IN | WEIGHT: 130.4 LBS | SYSTOLIC BLOOD PRESSURE: 106 MMHG

## 2022-04-04 DIAGNOSIS — G47.00 INSOMNIA, UNSPECIFIED TYPE: ICD-10-CM

## 2022-04-04 DIAGNOSIS — F41.8 DEPRESSION WITH ANXIETY: ICD-10-CM

## 2022-04-04 PROCEDURE — 99213 OFFICE O/P EST LOW 20 MIN: CPT | Performed by: INTERNAL MEDICINE

## 2022-04-04 RX ORDER — AMITRIPTYLINE HYDROCHLORIDE 10 MG/1
10 TABLET, FILM COATED ORAL NIGHTLY
Qty: 30 TABLET | Refills: 5 | Status: SHIPPED | OUTPATIENT
Start: 2022-04-04 | End: 2022-09-26 | Stop reason: SDUPTHER

## 2022-04-04 ASSESSMENT — PATIENT HEALTH QUESTIONNAIRE - PHQ9
SUM OF ALL RESPONSES TO PHQ QUESTIONS 1-9: 0
SUM OF ALL RESPONSES TO PHQ9 QUESTIONS 1 & 2: 0
2. FEELING DOWN, DEPRESSED OR HOPELESS: 0
SUM OF ALL RESPONSES TO PHQ QUESTIONS 1-9: 0
1. LITTLE INTEREST OR PLEASURE IN DOING THINGS: 0

## 2022-04-04 ASSESSMENT — ENCOUNTER SYMPTOMS
ABDOMINAL PAIN: 0
ABDOMINAL DISTENTION: 0
DIARRHEA: 0
VOMITING: 0
NAUSEA: 0
CONSTIPATION: 0
SINUS PRESSURE: 0
TROUBLE SWALLOWING: 0
EYE REDNESS: 0
SORE THROAT: 0
CHEST TIGHTNESS: 0
COUGH: 0
BLOOD IN STOOL: 0
SHORTNESS OF BREATH: 0
WHEEZING: 0
BACK PAIN: 0
EYE PAIN: 0
COLOR CHANGE: 0

## 2022-04-04 NOTE — ASSESSMENT & PLAN NOTE
Insomnia is better  Patient currently taking amitriptyline 10 mg daily  Patient has been seeing a neurologist for severe headaches which has helped her wean off amitriptyline, goal will be to decrease to 5 mg amitriptyline possibility of discontinuing it

## 2022-04-04 NOTE — ASSESSMENT & PLAN NOTE
Resolved, anxiety and depression which stemmed from insomnia which in itself is better  Patient currently taking amitriptyline 10 mg daily  Patient has been seeing a neurologist which has helped her wean off amitriptyline, goal will be to decrease to 5 mg amitriptyline possibility of discontinuing it

## 2022-04-04 NOTE — PROGRESS NOTES
[address] [place] [address] Dr (1990) is a 28 y.o. female   Anxiety and Depression     General health: This patient presents for check up and refills. The problem and medicine lists and chart were reviewed in detail. The patient has been worked up and treated for this/these condition/s and is compliant with taking the medication without any significant side effects. The patient's condition/s is/are chronic and unchanged and otherwise remains stable. Feels well with minor complaints. Main Problem Review -insomnia,headaches - patient feels better. Patient saw Neurologist in 1/2022, decided to decrease dose of amitriptyline to 10 mg. Patient fells better no headches sleeping better, no depression or anxiety. Health Maintenance    Urinary Incontinence screen - NEG screen  Flu shot - 1/4/2022   COVID-19 shot -patient due for COVID-19 booster shot   Annual retinal eye exam - Patient due  will need to schedule  Annual Dentist visit - Patient due  will need to schedule  Tobacco smoking  -  NO  Alcohol Misuse - NO  Illicit Drug Use- NO  Healthy Diet and physical activity -YES  Obesity Screen- screened 1/4/2022   Wears seat belt- YES  ASA prophylaxis - Aspirin Use to Prevent Cardiovascular Disease and Colorectal Cancer: Preventive Medication: adults aged 48 to 61 years with a 10% or greater 10-year cvd risk  End of life directives discussed with patient. -Mentions she does not have  a will/or/an advanced directives. Was instructed to start process looking into preparing her will an advanced directives. The ASCVD Risk score (Felix Kennedy, et al., 2013) failed to calculate for the following reasons: The 2013 ASCVD risk score is only valid for ages 36 to 78     Review of Systems   Constitutional: Negative for activity change, appetite change, chills, fatigue, fever and unexpected weight change. HENT: Negative for congestion, ear pain, postnasal drip, sinus pressure, sore throat, tinnitus and trouble swallowing.     Eyes: Negative for pain and redness. Respiratory: Negative for cough, chest tightness, shortness of breath and wheezing. Cardiovascular: Negative for chest pain, palpitations and leg swelling. Gastrointestinal: Negative for abdominal distention, abdominal pain, blood in stool, constipation, diarrhea, nausea and vomiting. Endocrine: Negative for cold intolerance, heat intolerance and polydipsia. Genitourinary: Negative for decreased urine volume, dysuria, flank pain, frequency, hematuria and urgency. Musculoskeletal: Negative for arthralgias, back pain, joint swelling, neck pain and neck stiffness. Skin: Negative for color change and rash. Neurological: Negative for dizziness, weakness, numbness and headaches. Hematological: Negative for adenopathy. Psychiatric/Behavioral: Negative for behavioral problems, sleep disturbance and suicidal ideas. The patient is not nervous/anxious. /74 (Site: Left Upper Arm, Position: Sitting, Cuff Size: Medium Adult)   Pulse 84   Resp 16   Ht 5' 1\" (1.549 m)   Wt 130 lb 6.4 oz (59.1 kg)   SpO2 99%   BMI 24.64 kg/m²    Physical Exam  Constitutional:       General: She is not in acute distress. Appearance: Normal appearance. She is not ill-appearing. HENT:      Head: Normocephalic and atraumatic. Right Ear: Tympanic membrane, ear canal and external ear normal. There is no impacted cerumen. Left Ear: Tympanic membrane, ear canal and external ear normal. There is no impacted cerumen. Mouth/Throat:      Mouth: Mucous membranes are moist.      Pharynx: No oropharyngeal exudate or posterior oropharyngeal erythema. Eyes:      Extraocular Movements: Extraocular movements intact. Conjunctiva/sclera: Conjunctivae normal.      Pupils: Pupils are equal, round, and reactive to light. Neck:      Vascular: No carotid bruit. Cardiovascular:      Rate and Rhythm: Normal rate and regular rhythm. Pulses: Normal pulses.       Heart sounds: Normal heart sounds. No murmur heard. No gallop. Pulmonary:      Effort: Pulmonary effort is normal.      Breath sounds: Normal breath sounds. No wheezing, rhonchi or rales. Abdominal:      General: Abdomen is flat. Bowel sounds are normal. There is no distension. Palpations: Abdomen is soft. Tenderness: There is no abdominal tenderness. There is no guarding or rebound. Musculoskeletal:         General: No swelling or tenderness. Normal range of motion. Cervical back: No tenderness. Lymphadenopathy:      Cervical: No cervical adenopathy. Skin:     Findings: No erythema, lesion or rash. Neurological:      General: No focal deficit present. Mental Status: She is alert and oriented to person, place, and time. Mental status is at baseline. Cranial Nerves: No cranial nerve deficit. Motor: No weakness. Psychiatric:         Mood and Affect: Mood normal.         Behavior: Behavior normal.         Thought Content: Thought content normal.         Judgment: Judgment normal.         ASSESSMENT/PLAN:  1. Depression with anxiety  Assessment & Plan:  Resolved, anxiety and depression which stemmed from insomnia which in itself is better  Patient currently taking amitriptyline 10 mg daily  Patient has been seeing a neurologist which has helped her wean off amitriptyline, goal will be to decrease to 5 mg amitriptyline possibility of discontinuing it  Orders:  -     amitriptyline (ELAVIL) 10 MG tablet; Take 1 tablet by mouth nightly [place] 30 minutos antes de acostarse, Disp-30 tablet, R-5Print  2. Insomnia, unspecified type  Assessment & Plan:  Insomnia is better  Patient currently taking amitriptyline 10 mg daily  Patient has been seeing a neurologist for severe headaches which has helped her wean off amitriptyline, goal will be to decrease to 5 mg amitriptyline possibility of discontinuing it  Orders:  -     amitriptyline (ELAVIL) 10 MG tablet;  Take 1 tablet by mouth nightly [place] 30 saray emery de acostarse, Disp-30 tablet, R-5Print      Preventative care discussed. Encouraged healthy diet choices, reg exercise. Patient agreed w/plan and verbal understanding. Patient advised to call or return with any concerns or problems or worsening conditions. Go to the ER for any severe or potentially life threatening problems. Return in about 6 months (around 10/4/2022). This note was generated in part or in whole with voice recognition software. Voice recognition is usually quite accurate but there are transcription errors that can often occur. All attempts were made to correct these errors I apologized for any  typographical errors that were not detected and corrected. Electronically signed by Checo Madsen.  Regine Gold MD on 4/4/2022 at 11:34 AM.

## 2022-09-26 ENCOUNTER — OFFICE VISIT (OUTPATIENT)
Dept: PRIMARY CARE CLINIC | Age: 32
End: 2022-09-26

## 2022-09-26 VITALS
BODY MASS INDEX: 25.04 KG/M2 | OXYGEN SATURATION: 98 % | HEIGHT: 61 IN | RESPIRATION RATE: 12 BRPM | SYSTOLIC BLOOD PRESSURE: 104 MMHG | WEIGHT: 132.6 LBS | HEART RATE: 71 BPM | DIASTOLIC BLOOD PRESSURE: 72 MMHG

## 2022-09-26 DIAGNOSIS — F41.8 DEPRESSION WITH ANXIETY: ICD-10-CM

## 2022-09-26 DIAGNOSIS — G47.00 INSOMNIA, UNSPECIFIED TYPE: ICD-10-CM

## 2022-09-26 PROCEDURE — 99213 OFFICE O/P EST LOW 20 MIN: CPT | Performed by: INTERNAL MEDICINE

## 2022-09-26 RX ORDER — AMITRIPTYLINE HYDROCHLORIDE 10 MG/1
TABLET, FILM COATED ORAL
Qty: 30 TABLET | Refills: 5 | Status: SHIPPED | OUTPATIENT
Start: 2022-09-26

## 2022-09-26 RX ORDER — TRAZODONE HYDROCHLORIDE 50 MG/1
50 TABLET ORAL NIGHTLY
COMMUNITY
End: 2022-09-26

## 2022-09-26 SDOH — ECONOMIC STABILITY: FOOD INSECURITY: WITHIN THE PAST 12 MONTHS, YOU WORRIED THAT YOUR FOOD WOULD RUN OUT BEFORE YOU GOT MONEY TO BUY MORE.: NEVER TRUE

## 2022-09-26 SDOH — ECONOMIC STABILITY: FOOD INSECURITY: WITHIN THE PAST 12 MONTHS, THE FOOD YOU BOUGHT JUST DIDN'T LAST AND YOU DIDN'T HAVE MONEY TO GET MORE.: NEVER TRUE

## 2022-09-26 ASSESSMENT — ENCOUNTER SYMPTOMS
DIARRHEA: 0
TROUBLE SWALLOWING: 0
BLOOD IN STOOL: 0
BACK PAIN: 0
VOMITING: 0
NAUSEA: 0
ABDOMINAL PAIN: 0
COUGH: 0
COLOR CHANGE: 0
WHEEZING: 0
EYE REDNESS: 0
SHORTNESS OF BREATH: 0
ABDOMINAL DISTENTION: 0
CONSTIPATION: 0
SINUS PRESSURE: 0
SORE THROAT: 0
EYE PAIN: 0
CHEST TIGHTNESS: 0

## 2022-09-26 ASSESSMENT — SOCIAL DETERMINANTS OF HEALTH (SDOH): HOW HARD IS IT FOR YOU TO PAY FOR THE VERY BASICS LIKE FOOD, HOUSING, MEDICAL CARE, AND HEATING?: NOT HARD AT ALL

## 2022-09-26 NOTE — ASSESSMENT & PLAN NOTE
Stable and controlled  Continue medication as documented in your medication list  Amitriptyline 5 milligrams before bed as needed

## 2022-09-26 NOTE — PROGRESS NOTES
[address] [place] [address] Dr (1990) is a 28 y.o. female   6 Month Follow-Up     General health: This patient presents for check up and refills. The problem and medicine lists and chart were reviewed in detail. The patient has been worked up and treated for this/these condition/s and is compliant with taking the medication without any significant side effects. The patient's condition/s is/are chronic and unchanged and otherwise remains stable. Feels well with minor complaints. Main Problem Review -patient/anxiety/insomnia - Patient  with depression anxiety and insomnia which have resolved for the most part. Patient she rarely has to take protriptyline and she needs to only take 5 mg before bed. Health Maintenance    Urinary Incontinence screen - NEG screen  Flu shot - 1/4/2022   COVID-19 shot -patient due for COVID-19 booster shot   Annual retinal eye exam - Patient due  will need to schedule  Annual Dentist visit - Patient due  will need to schedule  Tobacco smoking  -  NO  Alcohol Misuse - NO  Illicit Drug Use- NO  Healthy Diet and physical activity -YES  Obesity Screen- screened 1/4/2022   Wears seat belt- YES  ASA prophylaxis - Aspirin Use to Prevent Cardiovascular Disease and Colorectal Cancer: Preventive Medication: adults aged 48 to 61 years with a 10% or greater 10-year cvd risk  End of life directives discussed with patient. -Mentions she does not have  a will/or/an advanced directives. Was instructed to start process looking into preparing her will an advanced directives. The ASCVD Risk score (Liset ABREU, et al., 2019) failed to calculate for the following reasons: The 2019 ASCVD risk score is only valid for ages 36 to 78     Review of Systems   Constitutional:  Negative for activity change, appetite change, chills, fatigue, fever and unexpected weight change. HENT:  Negative for congestion, ear pain, postnasal drip, sinus pressure, sore throat, tinnitus and trouble swallowing.     Eyes:  Negative for pain and redness. Respiratory:  Negative for cough, chest tightness, shortness of breath and wheezing. Cardiovascular:  Negative for chest pain, palpitations and leg swelling. Gastrointestinal:  Negative for abdominal distention, abdominal pain, blood in stool, constipation, diarrhea, nausea and vomiting. Endocrine: Negative for cold intolerance, heat intolerance and polydipsia. Genitourinary:  Negative for decreased urine volume, dysuria, flank pain, frequency, hematuria and urgency. Musculoskeletal:  Negative for arthralgias, back pain, joint swelling, neck pain and neck stiffness. Skin:  Negative for color change and rash. Neurological:  Negative for dizziness, weakness, numbness and headaches. Hematological:  Negative for adenopathy. Psychiatric/Behavioral:  Negative for behavioral problems, sleep disturbance and suicidal ideas. The patient is not nervous/anxious. /72 (Site: Left Upper Arm, Position: Sitting, Cuff Size: Large Adult)   Pulse 71   Resp 12   Ht 5' 1\" (1.549 m)   Wt 132 lb 9.6 oz (60.1 kg)   SpO2 98%   BMI 25.05 kg/m²    Physical Exam  Constitutional:       General: She is not in acute distress. Appearance: Normal appearance. She is not ill-appearing. HENT:      Head: Normocephalic and atraumatic. Right Ear: External ear normal.      Left Ear: External ear normal.      Mouth/Throat:      Mouth: Mucous membranes are moist.      Pharynx: No oropharyngeal exudate or posterior oropharyngeal erythema. Eyes:      Extraocular Movements: Extraocular movements intact. Conjunctiva/sclera: Conjunctivae normal.      Pupils: Pupils are equal, round, and reactive to light. Neck:      Vascular: No carotid bruit. Cardiovascular:      Rate and Rhythm: Normal rate and regular rhythm. Pulses: Normal pulses. Heart sounds: Normal heart sounds. No murmur heard. No gallop.    Pulmonary:      Effort: Pulmonary effort is normal.      Breath sounds: Normal breath sounds. No wheezing, rhonchi or rales. Abdominal:      General: Abdomen is flat. There is no distension. Tenderness: There is no abdominal tenderness. There is no guarding or rebound. Musculoskeletal:         General: No swelling or tenderness. Normal range of motion. Cervical back: No tenderness. Lymphadenopathy:      Cervical: No cervical adenopathy. Skin:     Findings: No erythema, lesion or rash. Neurological:      General: No focal deficit present. Mental Status: She is alert and oriented to person, place, and time. Mental status is at baseline. Cranial Nerves: No cranial nerve deficit. Motor: No weakness. Psychiatric:         Mood and Affect: Mood normal.         Behavior: Behavior normal.         Thought Content: Thought content normal.         Judgment: Judgment normal.       ASSESSMENT/PLAN:  1. Depression with anxiety  Assessment & Plan:   Stable and controlled  Continue medication as documented in your medication list  Amitriptyline 5 milligrams before bed as needed     Orders:  -     amitriptyline (ELAVIL) 10 MG tablet; [place] 5 mg cuando necesite 30 minutos antes de acostarse, Disp-30 tablet, R-5Print  2. Insomnia, unspecified type  Assessment & Plan:    Stable and controlled  Continue medication as documented in your medication list  Amitriptyline 5 milligrams before bed as needed  Orders:  -     amitriptyline (ELAVIL) 10 MG tablet; [place] 5 mg cuando necesite 30 minutos antes de acostarse, Disp-30 tablet, R-5Print    Preventative care discussed. Encouraged healthy diet choices, reg exercise. Patient agreed w/plan and verbal understanding. Patient advised to call or return with any concerns or problems or worsening conditions. Go to the ER for any severe or potentially life threatening problems. Return in about 6 months (around 3/26/2023). This note was generated in part or in whole with voice recognition software.   Voice recognition is usually

## 2023-03-27 ENCOUNTER — OFFICE VISIT (OUTPATIENT)
Dept: PRIMARY CARE CLINIC | Age: 33
End: 2023-03-27

## 2023-03-27 VITALS
HEART RATE: 97 BPM | RESPIRATION RATE: 14 BRPM | WEIGHT: 126.6 LBS | SYSTOLIC BLOOD PRESSURE: 96 MMHG | DIASTOLIC BLOOD PRESSURE: 56 MMHG | BODY MASS INDEX: 23.9 KG/M2 | HEIGHT: 61 IN | OXYGEN SATURATION: 98 %

## 2023-03-27 DIAGNOSIS — Z11.4 ENCOUNTER FOR SCREENING FOR HIV: ICD-10-CM

## 2023-03-27 DIAGNOSIS — F41.8 DEPRESSION WITH ANXIETY: ICD-10-CM

## 2023-03-27 DIAGNOSIS — Z13.220 SCREENING CHOLESTEROL LEVEL: ICD-10-CM

## 2023-03-27 DIAGNOSIS — Z11.59 ENCOUNTER FOR HEPATITIS C SCREENING TEST FOR LOW RISK PATIENT: ICD-10-CM

## 2023-03-27 DIAGNOSIS — G47.00 INSOMNIA, UNSPECIFIED TYPE: ICD-10-CM

## 2023-03-27 DIAGNOSIS — R73.09 IMPAIRED GLUCOSE REGULATION: ICD-10-CM

## 2023-03-27 DIAGNOSIS — Z00.00 ENCOUNTER FOR WELL ADULT EXAM WITHOUT ABNORMAL FINDINGS: Primary | ICD-10-CM

## 2023-03-27 DIAGNOSIS — L70.0 ACNE VULGARIS: ICD-10-CM

## 2023-03-27 PROCEDURE — 99395 PREV VISIT EST AGE 18-39: CPT | Performed by: INTERNAL MEDICINE

## 2023-03-27 RX ORDER — AMITRIPTYLINE HYDROCHLORIDE 10 MG/1
TABLET, FILM COATED ORAL
Qty: 30 TABLET | Refills: 5 | Status: SHIPPED | OUTPATIENT
Start: 2023-03-27

## 2023-03-27 SDOH — ECONOMIC STABILITY: INCOME INSECURITY: HOW HARD IS IT FOR YOU TO PAY FOR THE VERY BASICS LIKE FOOD, HOUSING, MEDICAL CARE, AND HEATING?: NOT HARD AT ALL

## 2023-03-27 SDOH — ECONOMIC STABILITY: FOOD INSECURITY: WITHIN THE PAST 12 MONTHS, THE FOOD YOU BOUGHT JUST DIDN'T LAST AND YOU DIDN'T HAVE MONEY TO GET MORE.: NEVER TRUE

## 2023-03-27 SDOH — ECONOMIC STABILITY: FOOD INSECURITY: WITHIN THE PAST 12 MONTHS, YOU WORRIED THAT YOUR FOOD WOULD RUN OUT BEFORE YOU GOT MONEY TO BUY MORE.: NEVER TRUE

## 2023-03-27 SDOH — ECONOMIC STABILITY: HOUSING INSECURITY
IN THE LAST 12 MONTHS, WAS THERE A TIME WHEN YOU DID NOT HAVE A STEADY PLACE TO SLEEP OR SLEPT IN A SHELTER (INCLUDING NOW)?: NO

## 2023-03-27 ASSESSMENT — ENCOUNTER SYMPTOMS
DIARRHEA: 0
VOMITING: 0
COLOR CHANGE: 0
CONSTIPATION: 0
EYE PAIN: 0
SORE THROAT: 0
ABDOMINAL DISTENTION: 0
COUGH: 0
WHEEZING: 0
EYE REDNESS: 0
NAUSEA: 0
SHORTNESS OF BREATH: 0
BACK PAIN: 0
CHEST TIGHTNESS: 0
TROUBLE SWALLOWING: 0
SINUS PRESSURE: 0
BLOOD IN STOOL: 0
ABDOMINAL PAIN: 0

## 2023-03-27 ASSESSMENT — PATIENT HEALTH QUESTIONNAIRE - PHQ9
SUM OF ALL RESPONSES TO PHQ QUESTIONS 1-9: 0
10. IF YOU CHECKED OFF ANY PROBLEMS, HOW DIFFICULT HAVE THESE PROBLEMS MADE IT FOR YOU TO DO YOUR WORK, TAKE CARE OF THINGS AT HOME, OR GET ALONG WITH OTHER PEOPLE: 0
SUM OF ALL RESPONSES TO PHQ QUESTIONS 1-9: 0
1. LITTLE INTEREST OR PLEASURE IN DOING THINGS: 0
SUM OF ALL RESPONSES TO PHQ QUESTIONS 1-9: 0
4. FEELING TIRED OR HAVING LITTLE ENERGY: 0
SUM OF ALL RESPONSES TO PHQ QUESTIONS 1-9: 0
6. FEELING BAD ABOUT YOURSELF - OR THAT YOU ARE A FAILURE OR HAVE LET YOURSELF OR YOUR FAMILY DOWN: 0
9. THOUGHTS THAT YOU WOULD BE BETTER OFF DEAD, OR OF HURTING YOURSELF: 0
5. POOR APPETITE OR OVEREATING: 0
8. MOVING OR SPEAKING SO SLOWLY THAT OTHER PEOPLE COULD HAVE NOTICED. OR THE OPPOSITE, BEING SO FIGETY OR RESTLESS THAT YOU HAVE BEEN MOVING AROUND A LOT MORE THAN USUAL: 0
SUM OF ALL RESPONSES TO PHQ9 QUESTIONS 1 & 2: 0
2. FEELING DOWN, DEPRESSED OR HOPELESS: 0
3. TROUBLE FALLING OR STAYING ASLEEP: 0

## 2023-03-27 NOTE — ASSESSMENT & PLAN NOTE
Stable and controlled  Continue medication as documented in your medication list  Amitriptyline 10 milligrams before bed as needed

## 2023-03-27 NOTE — ASSESSMENT & PLAN NOTE
Patient sees dermatology for the same, has her next appointment in 5/23.   Follow-up with specialist as per their orders

## 2023-03-27 NOTE — PROGRESS NOTES
PCR-A; Future  -     CBC with Auto Differential; Future  -     Comprehensive Metabolic Panel; Future  -     Hemoglobin A1C; Future  -     HIV Screen; Future  -     Lipid Panel; Future  2. Depression with anxiety  Assessment & Plan:   Stable and controlled  Continue medication as documented in your medication list  Amitriptyline 10 milligrams before bed as needed     Orders:  -     amitriptyline (ELAVIL) 10 MG tablet; Ragan 5 mg cuando necesite 30 minutos antes de acostarse, Disp-30 tablet, R-5Normal  -     CBC with Auto Differential; Future  -     Comprehensive Metabolic Panel; Future  3. Insomnia, unspecified type  Assessment & Plan:  Insomnia is better  Patient currently taking amitriptyline 10 mg daily    Orders:  -     amitriptyline (ELAVIL) 10 MG tablet; Ragan 5 mg cuando necesite 30 minutos antes de acostarse, Disp-30 tablet, R-5Normal  4. Impaired glucose regulation  -     CBC with Auto Differential; Future  -     Comprehensive Metabolic Panel; Future  -     Hemoglobin A1C; Future  5. Encounter for hepatitis C screening test for low risk patient  -     Hep C AB RLFX HCV PCR-A; Future  6. Encounter for screening for HIV  -     HIV Screen; Future  7. Screening cholesterol level  -     Lipid Panel; Future  8. Acne vulgaris  Assessment & Plan:   Patient sees dermatology for the same, has her next appointment in 5/23. Follow-up with specialist as per their orders     Well adult exam  -  Anticipatory Guidance  Injury Prevention  Lap-shoulder belts, Smoke detectors, Carbon monoxide detectors, Safe storage and handling of firearms; removal if appropriate and  Occupational risk counseling  Substance Abuse  - Tobacco/alcohol/drug cessation or never starting any of those. Include pharmacotherapy, social support for cessation if applicable to patient, and skills training/problem solving. Availability of treatment for abuse.     Sexual Behavior  - STD prevention; abstinence; avoid high-risk behavior; condoms/female barrier

## 2023-03-27 NOTE — PATIENT INSTRUCTIONS
A and hepatitis B. These two diseases can be spread through sex. You also can get hepatitis A if you eat infected food. Use condoms or female condoms each time and every time you have sex. Learn the right way to use a male condom:  Condoms come in several sizes. Make sure you use the right size. A condom that is too small can break easily. A condom that is too big can slip off during sex. Use a new condom each time you have sex. Be careful not to poke a hole in the condom when you open the wrapper. Squeeze the tip of the condom to keep out air. Pull down the loose skin (foreskin) from the head of an uncircumcised penis. While squeezing the tip of the condom, unroll it all the way down to the base of the firm penis. Never use petroleum jelly (such as Vaseline), grease, hand lotion, baby oil, or anything with oil in it. These products can make holes in the condom. After sex, hold the condom on your penis as you remove your penis from your partner. This will keep semen from spilling out of the condom. Learn to use a female condom:  You can put in a female condom up to 8 hours before sex. Squeeze the smaller ring at the closed end and insert it deep into the vagina. The larger ring at the open end should stay outside the vagina. During sex, make sure the penis goes into the condom. After the penis is removed, close the open end of the condom by twisting it. Remove the condom. Do not use a female condom and male condom at the same time. Do not have sex with anyone who has symptoms of an STI, such as sores on the genitals or mouth. The herpes virus that causes cold sores can spread to and from the penis and vagina. Do not drink a lot of alcohol or use drugs before sex. This can cause you to let down your guard and not practice safer sex. Having one sex partner (who does not have STIs and does not have sex with anyone else) is a sure way to avoid STIs. Talk to your partner before you have sex.  Find out if

## 2023-04-26 PROBLEM — Z00.00 ENCOUNTER FOR WELL ADULT EXAM WITHOUT ABNORMAL FINDINGS: Status: RESOLVED | Noted: 2023-03-27 | Resolved: 2023-04-26

## 2023-09-25 ENCOUNTER — OFFICE VISIT (OUTPATIENT)
Dept: PRIMARY CARE CLINIC | Age: 33
End: 2023-09-25

## 2023-09-25 VITALS
DIASTOLIC BLOOD PRESSURE: 64 MMHG | SYSTOLIC BLOOD PRESSURE: 100 MMHG | HEART RATE: 82 BPM | OXYGEN SATURATION: 98 % | HEIGHT: 61 IN | BODY MASS INDEX: 24.73 KG/M2 | RESPIRATION RATE: 14 BRPM | WEIGHT: 131 LBS

## 2023-09-25 DIAGNOSIS — G47.00 INSOMNIA, UNSPECIFIED TYPE: ICD-10-CM

## 2023-09-25 DIAGNOSIS — F41.8 DEPRESSION WITH ANXIETY: ICD-10-CM

## 2023-09-25 PROCEDURE — 99213 OFFICE O/P EST LOW 20 MIN: CPT | Performed by: INTERNAL MEDICINE

## 2023-09-25 RX ORDER — AMITRIPTYLINE HYDROCHLORIDE 10 MG/1
TABLET, FILM COATED ORAL
Qty: 30 TABLET | Refills: 5 | Status: SHIPPED | OUTPATIENT
Start: 2023-09-25

## 2023-09-25 ASSESSMENT — ENCOUNTER SYMPTOMS
ABDOMINAL PAIN: 0
SHORTNESS OF BREATH: 0
SINUS PRESSURE: 0
TROUBLE SWALLOWING: 0
CHEST TIGHTNESS: 0
SORE THROAT: 0
WHEEZING: 0
COLOR CHANGE: 0
DIARRHEA: 0
ABDOMINAL DISTENTION: 0
COUGH: 0
VOMITING: 0
EYE PAIN: 0
CONSTIPATION: 0
BACK PAIN: 0
NAUSEA: 0
EYE REDNESS: 0
BLOOD IN STOOL: 0

## 2023-10-10 ENCOUNTER — TELEPHONE (OUTPATIENT)
Dept: PRIMARY CARE CLINIC | Age: 33
End: 2023-10-10

## 2023-10-10 DIAGNOSIS — G47.00 INSOMNIA, UNSPECIFIED TYPE: ICD-10-CM

## 2023-10-10 DIAGNOSIS — F41.8 DEPRESSION WITH ANXIETY: ICD-10-CM

## 2023-10-10 RX ORDER — AMITRIPTYLINE HYDROCHLORIDE 10 MG/1
TABLET, FILM COATED ORAL
Qty: 30 TABLET | Refills: 5 | Status: SHIPPED | OUTPATIENT
Start: 2023-10-10

## 2024-01-02 ENCOUNTER — TELEPHONE (OUTPATIENT)
Dept: PRIMARY CARE CLINIC | Age: 34
End: 2024-01-02

## 2024-01-02 DIAGNOSIS — G47.00 INSOMNIA, UNSPECIFIED TYPE: ICD-10-CM

## 2024-01-02 DIAGNOSIS — F41.8 DEPRESSION WITH ANXIETY: ICD-10-CM

## 2024-01-02 RX ORDER — AMITRIPTYLINE HYDROCHLORIDE 25 MG/1
25 TABLET, FILM COATED ORAL NIGHTLY PRN
Qty: 30 TABLET | Refills: 5 | Status: SHIPPED | OUTPATIENT
Start: 2024-01-02

## 2024-01-02 NOTE — TELEPHONE ENCOUNTER
Patient takes her amitriptyline (ELAVIL) 10 MG tablet but has discussed with Dr. Lewis that if the MG is not working he would change the prescription. Her  came in because the medication is not working and the patient is taking more than 1 tablet at bedtime. Patient would like to change the MG for this prescription.

## 2025-07-28 ENCOUNTER — TELEPHONE (OUTPATIENT)
Dept: PRIMARY CARE CLINIC | Age: 35
End: 2025-07-28

## 2025-07-28 NOTE — TELEPHONE ENCOUNTER
Please let patient know that amitriptyline is NOT recommended in breast-feeding it causes too much sedation in the  infants.  I will be sending refill for amitriptyline.  When patient is done with nursing and she can schedule an appointment for a  physical since she has not been seen for some time.

## 2025-07-28 NOTE — TELEPHONE ENCOUNTER
Dana's  Te came in and wanted to know if it was ok for Dana to take Amitriptyline while breastfeeding. If so, she needs a refill .

## 2025-07-29 NOTE — TELEPHONE ENCOUNTER
LM with Dana to    let patient know that amitriptyline is NOT recommended in breast-feeding it causes too much sedation in the  infants.Dr. Lewis will be sending refill for amitriptyline.  When patient is done with nursing and she can schedule an appointment for a  physical since she has not been seen for some time.

## 2025-08-12 ENCOUNTER — OFFICE VISIT (OUTPATIENT)
Dept: PRIMARY CARE CLINIC | Age: 35
End: 2025-08-12

## 2025-08-12 VITALS
OXYGEN SATURATION: 99 % | WEIGHT: 131.8 LBS | BODY MASS INDEX: 24.88 KG/M2 | SYSTOLIC BLOOD PRESSURE: 110 MMHG | DIASTOLIC BLOOD PRESSURE: 66 MMHG | HEIGHT: 61 IN | TEMPERATURE: 98.7 F | HEART RATE: 92 BPM

## 2025-08-12 DIAGNOSIS — G47.00 INSOMNIA, UNSPECIFIED TYPE: Primary | ICD-10-CM

## 2025-08-12 DIAGNOSIS — F41.8 DEPRESSION WITH ANXIETY: ICD-10-CM

## 2025-08-12 PROCEDURE — 99213 OFFICE O/P EST LOW 20 MIN: CPT | Performed by: INTERNAL MEDICINE

## 2025-08-12 RX ORDER — HYDROXYZINE HYDROCHLORIDE 25 MG/1
25 TABLET, FILM COATED ORAL EVERY 8 HOURS PRN
Qty: 90 TABLET | Refills: 1 | Status: SHIPPED | OUTPATIENT
Start: 2025-08-12 | End: 2025-10-11

## 2025-08-12 RX ORDER — VITAMIN A ACETATE, BETA CAROTENE, ASCORBIC ACID, CHOLECALCIFEROL, .ALPHA.-TOCOPHEROL ACETATE, DL-, THIAMINE MONONITRATE, RIBOFLAVIN, NIACINAMIDE, PYRIDOXINE HYDROCHLORIDE, FOLIC ACID, CYANOCOBALAMIN, CALCIUM CARBONATE, FERROUS FUMARATE, ZINC OXIDE, CUPRIC OXIDE 3080; 12; 120; 400; 1; 1.84; 3; 20; 22; 920; 25; 200; 27; 10; 2 [IU]/1; UG/1; MG/1; [IU]/1; MG/1; MG/1; MG/1; MG/1; MG/1; [IU]/1; MG/1; MG/1; MG/1; MG/1; MG/1
1 TABLET, FILM COATED ORAL DAILY
COMMUNITY
Start: 2025-01-27

## 2025-08-12 SDOH — ECONOMIC STABILITY: FOOD INSECURITY: WITHIN THE PAST 12 MONTHS, YOU WORRIED THAT YOUR FOOD WOULD RUN OUT BEFORE YOU GOT MONEY TO BUY MORE.: NEVER TRUE

## 2025-08-12 SDOH — ECONOMIC STABILITY: FOOD INSECURITY: WITHIN THE PAST 12 MONTHS, THE FOOD YOU BOUGHT JUST DIDN'T LAST AND YOU DIDN'T HAVE MONEY TO GET MORE.: NEVER TRUE

## 2025-08-12 ASSESSMENT — PATIENT HEALTH QUESTIONNAIRE - PHQ9
SUM OF ALL RESPONSES TO PHQ QUESTIONS 1-9: 4
SUM OF ALL RESPONSES TO PHQ QUESTIONS 1-9: 4
7. TROUBLE CONCENTRATING ON THINGS, SUCH AS READING THE NEWSPAPER OR WATCHING TELEVISION: NOT AT ALL
SUM OF ALL RESPONSES TO PHQ QUESTIONS 1-9: 4
2. FEELING DOWN, DEPRESSED OR HOPELESS: NOT AT ALL
6. FEELING BAD ABOUT YOURSELF - OR THAT YOU ARE A FAILURE OR HAVE LET YOURSELF OR YOUR FAMILY DOWN: NOT AT ALL
4. FEELING TIRED OR HAVING LITTLE ENERGY: NEARLY EVERY DAY
SUM OF ALL RESPONSES TO PHQ QUESTIONS 1-9: 4
5. POOR APPETITE OR OVEREATING: NOT AT ALL
9. THOUGHTS THAT YOU WOULD BE BETTER OFF DEAD, OR OF HURTING YOURSELF: NOT AT ALL
10. IF YOU CHECKED OFF ANY PROBLEMS, HOW DIFFICULT HAVE THESE PROBLEMS MADE IT FOR YOU TO DO YOUR WORK, TAKE CARE OF THINGS AT HOME, OR GET ALONG WITH OTHER PEOPLE: NOT DIFFICULT AT ALL
1. LITTLE INTEREST OR PLEASURE IN DOING THINGS: NOT AT ALL
8. MOVING OR SPEAKING SO SLOWLY THAT OTHER PEOPLE COULD HAVE NOTICED. OR THE OPPOSITE, BEING SO FIGETY OR RESTLESS THAT YOU HAVE BEEN MOVING AROUND A LOT MORE THAN USUAL: NOT AT ALL
3. TROUBLE FALLING OR STAYING ASLEEP: SEVERAL DAYS

## 2025-08-12 ASSESSMENT — ENCOUNTER SYMPTOMS
ABDOMINAL DISTENTION: 0
BACK PAIN: 0
SHORTNESS OF BREATH: 0
EYE PAIN: 0
CHEST TIGHTNESS: 0
TROUBLE SWALLOWING: 0
COLOR CHANGE: 0
SORE THROAT: 0
DIARRHEA: 0
ABDOMINAL PAIN: 0
EYE REDNESS: 0
NAUSEA: 0
WHEEZING: 0
VOMITING: 0
CONSTIPATION: 0
COUGH: 0
SINUS PRESSURE: 0
BLOOD IN STOOL: 0

## 2025-08-29 ENCOUNTER — TELEPHONE (OUTPATIENT)
Dept: PRIMARY CARE CLINIC | Age: 35
End: 2025-08-29

## 2025-08-29 DIAGNOSIS — F41.8 DEPRESSION WITH ANXIETY: ICD-10-CM

## 2025-08-29 DIAGNOSIS — G47.00 INSOMNIA, UNSPECIFIED TYPE: ICD-10-CM
